# Patient Record
Sex: MALE | Race: WHITE | NOT HISPANIC OR LATINO | Employment: FULL TIME | URBAN - METROPOLITAN AREA
[De-identification: names, ages, dates, MRNs, and addresses within clinical notes are randomized per-mention and may not be internally consistent; named-entity substitution may affect disease eponyms.]

---

## 2021-03-30 PROCEDURE — 88305 TISSUE EXAM BY PATHOLOGIST: CPT | Performed by: INTERNAL MEDICINE

## 2021-03-31 ENCOUNTER — LAB REQUISITION (OUTPATIENT)
Dept: LAB | Facility: HOSPITAL | Age: 46
End: 2021-03-31

## 2021-03-31 DIAGNOSIS — Z80.0 FAMILY HISTORY OF MALIGNANT NEOPLASM OF DIGESTIVE ORGANS: ICD-10-CM

## 2021-03-31 DIAGNOSIS — K92.1 MELENA: ICD-10-CM

## 2021-04-01 LAB
LAB AP CASE REPORT: NORMAL
LAB AP DIAGNOSIS COMMENT: NORMAL
PATH REPORT.FINAL DX SPEC: NORMAL
PATH REPORT.GROSS SPEC: NORMAL

## 2023-01-02 ENCOUNTER — APPOINTMENT (OUTPATIENT)
Dept: CT IMAGING | Facility: HOSPITAL | Age: 48
End: 2023-01-02
Payer: COMMERCIAL

## 2023-01-02 ENCOUNTER — HOSPITAL ENCOUNTER (EMERGENCY)
Facility: HOSPITAL | Age: 48
Discharge: HOME | End: 2023-01-02
Attending: EMERGENCY MEDICINE | Admitting: EMERGENCY MEDICINE
Payer: COMMERCIAL

## 2023-01-02 VITALS
HEIGHT: 65 IN | WEIGHT: 179 LBS | RESPIRATION RATE: 18 BRPM | OXYGEN SATURATION: 98 % | BODY MASS INDEX: 29.82 KG/M2 | HEART RATE: 83 BPM | DIASTOLIC BLOOD PRESSURE: 89 MMHG | SYSTOLIC BLOOD PRESSURE: 124 MMHG | TEMPERATURE: 98 F

## 2023-01-02 DIAGNOSIS — Q87.89: Primary | ICD-10-CM

## 2023-01-02 PROCEDURE — 99282 EMERGENCY DEPT VISIT SF MDM: CPT | Performed by: EMERGENCY MEDICINE

## 2023-01-02 PROCEDURE — 99282 EMERGENCY DEPT VISIT SF MDM: CPT

## 2023-01-02 PROCEDURE — 70450 CT HEAD/BRAIN W/O DYE: CPT

## 2023-01-02 RX ORDER — NAPROXEN 500 MG/1
500 TABLET ORAL 2 TIMES DAILY PRN
Qty: 14 TABLET | Refills: 0 | Status: SHIPPED | OUTPATIENT
Start: 2023-01-02
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
## 2023-01-02 NOTE — DISCHARGE INSTRUCTIONS
Warm moist heat to posterior occiput.  Use the anti-inflammatory this may help with the inflammation of your musculature that is posterior occiput.  It may help your headache.

## 2023-01-02 NOTE — Clinical Note
[facility] [place] [address] [place] [address] [place] [address]  Phone: [phone]    Ta Mcnamara was seen and treated in our emergency department on 1/2/2023.  He may return to work on 01/03/2023.         Thank you for choosing [facility].    Sunil Benoit MD

## 2023-01-02 NOTE — Clinical Note
[facility] [place] [address] [place] [address] [place] [address]  Phone: [phone]    Ta Mcnamara was seen and treated in our emergency department on 1/2/2023.  He may return to work on 01/03/2023.         Thank you for choosing [facility].    Sunil Benoit MD

## 2023-09-14 ENCOUNTER — OFFICE (OUTPATIENT)
Dept: URBAN - METROPOLITAN AREA PATHOLOGY 4 | Facility: PATHOLOGY | Age: 48
End: 2023-09-14
Payer: COMMERCIAL

## 2023-09-14 ENCOUNTER — OFFICE (OUTPATIENT)
Dept: URBAN - METROPOLITAN AREA PATHOLOGY 4 | Facility: PATHOLOGY | Age: 48
End: 2023-09-14
Payer: MEDICAID

## 2023-09-14 ENCOUNTER — ON CAMPUS - OUTPATIENT (OUTPATIENT)
Dept: URBAN - METROPOLITAN AREA HOSPITAL 2 | Facility: HOSPITAL | Age: 48
End: 2023-09-14
Payer: MEDICAID

## 2023-09-14 VITALS
HEART RATE: 70 BPM | HEART RATE: 65 BPM | SYSTOLIC BLOOD PRESSURE: 106 MMHG | SYSTOLIC BLOOD PRESSURE: 123 MMHG | HEIGHT: 65 IN | OXYGEN SATURATION: 100 % | DIASTOLIC BLOOD PRESSURE: 80 MMHG | SYSTOLIC BLOOD PRESSURE: 117 MMHG | SYSTOLIC BLOOD PRESSURE: 111 MMHG | RESPIRATION RATE: 15 BRPM | WEIGHT: 175 LBS | DIASTOLIC BLOOD PRESSURE: 83 MMHG | DIASTOLIC BLOOD PRESSURE: 75 MMHG | DIASTOLIC BLOOD PRESSURE: 81 MMHG | HEART RATE: 61 BPM | DIASTOLIC BLOOD PRESSURE: 79 MMHG | DIASTOLIC BLOOD PRESSURE: 88 MMHG | OXYGEN SATURATION: 96 % | RESPIRATION RATE: 19 BRPM | HEART RATE: 68 BPM | HEART RATE: 74 BPM | HEART RATE: 66 BPM | TEMPERATURE: 97.5 F | OXYGEN SATURATION: 99 % | OXYGEN SATURATION: 98 % | SYSTOLIC BLOOD PRESSURE: 129 MMHG | DIASTOLIC BLOOD PRESSURE: 94 MMHG | HEART RATE: 72 BPM | SYSTOLIC BLOOD PRESSURE: 113 MMHG | SYSTOLIC BLOOD PRESSURE: 118 MMHG | RESPIRATION RATE: 17 BRPM | RESPIRATION RATE: 16 BRPM | SYSTOLIC BLOOD PRESSURE: 112 MMHG

## 2023-09-14 DIAGNOSIS — D12.2 BENIGN NEOPLASM OF ASCENDING COLON: ICD-10-CM

## 2023-09-14 DIAGNOSIS — Z12.11 ENCOUNTER FOR SCREENING FOR MALIGNANT NEOPLASM OF COLON: ICD-10-CM

## 2023-09-14 DIAGNOSIS — K62.1 RECTAL POLYP: ICD-10-CM

## 2023-09-14 PROBLEM — K63.5 POLYP OF COLON: Status: ACTIVE | Noted: 2023-09-14

## 2023-09-14 LAB
GI HISTOLOGY: A. UNSPECIFIED: (no result)
GI HISTOLOGY: B. UNSPECIFIED: (no result)
GI HISTOLOGY: PDF REPORT: (no result)

## 2023-09-14 PROCEDURE — 45385 COLONOSCOPY W/LESION REMOVAL: CPT | Mod: 33 | Performed by: INTERNAL MEDICINE

## 2023-09-14 PROCEDURE — 88305 TISSUE EXAM BY PATHOLOGIST: CPT | Mod: 26 | Performed by: INTERNAL MEDICINE
